# Patient Record
Sex: FEMALE | Race: BLACK OR AFRICAN AMERICAN | NOT HISPANIC OR LATINO | Employment: UNEMPLOYED | URBAN - METROPOLITAN AREA
[De-identification: names, ages, dates, MRNs, and addresses within clinical notes are randomized per-mention and may not be internally consistent; named-entity substitution may affect disease eponyms.]

---

## 2023-10-24 ENCOUNTER — HOSPITAL ENCOUNTER (EMERGENCY)
Facility: HOSPITAL | Age: 23
Discharge: HOME | End: 2023-10-24
Payer: COMMERCIAL

## 2023-10-24 VITALS
SYSTOLIC BLOOD PRESSURE: 127 MMHG | OXYGEN SATURATION: 99 % | BODY MASS INDEX: 31.28 KG/M2 | TEMPERATURE: 99.3 F | HEART RATE: 88 BPM | DIASTOLIC BLOOD PRESSURE: 88 MMHG | WEIGHT: 170 LBS | RESPIRATION RATE: 16 BRPM | HEIGHT: 62 IN

## 2023-10-24 DIAGNOSIS — B96.89 BACTERIAL VAGINOSIS: ICD-10-CM

## 2023-10-24 DIAGNOSIS — N76.0 BACTERIAL VAGINOSIS: ICD-10-CM

## 2023-10-24 DIAGNOSIS — N76.0 ACUTE VAGINITIS: Primary | ICD-10-CM

## 2023-10-24 DIAGNOSIS — N89.8 VAGINAL DISCHARGE: ICD-10-CM

## 2023-10-24 LAB
APPEARANCE UR: CLEAR
BACTERIA #/AREA URNS AUTO: ABNORMAL /HPF
BILIRUB UR STRIP.AUTO-MCNC: NEGATIVE MG/DL
CLUE CELLS SPEC QL WET PREP: PRESENT
COLOR UR: YELLOW
GLUCOSE UR STRIP.AUTO-MCNC: NORMAL MG/DL
HCG UR QL IA.RAPID: NEGATIVE
HYALINE CASTS #/AREA URNS AUTO: ABNORMAL /LPF
KETONES UR STRIP.AUTO-MCNC: ABNORMAL MG/DL
LEUKOCYTE ESTERASE UR QL STRIP.AUTO: NEGATIVE
MUCOUS THREADS #/AREA URNS AUTO: ABNORMAL /LPF
NITRITE UR QL STRIP.AUTO: NEGATIVE
PH UR STRIP.AUTO: 6 [PH]
PROT UR STRIP.AUTO-MCNC: ABNORMAL MG/DL
RBC # UR STRIP.AUTO: NEGATIVE /UL
RBC #/AREA URNS AUTO: ABNORMAL /HPF
SP GR UR STRIP.AUTO: 1.03
SQUAMOUS #/AREA URNS AUTO: ABNORMAL /HPF
T VAGINALIS SPEC QL WET PREP: ABNORMAL
UROBILINOGEN UR STRIP.AUTO-MCNC: NORMAL MG/DL
WBC #/AREA URNS AUTO: ABNORMAL /HPF
WBC VAG QL WET PREP: ABNORMAL
YEAST VAG QL WET PREP: ABNORMAL

## 2023-10-24 PROCEDURE — 2500000001 HC RX 250 WO HCPCS SELF ADMINISTERED DRUGS (ALT 637 FOR MEDICARE OP): Performed by: PHYSICIAN ASSISTANT

## 2023-10-24 PROCEDURE — 81001 URINALYSIS AUTO W/SCOPE: CPT | Performed by: PHYSICIAN ASSISTANT

## 2023-10-24 PROCEDURE — 87800 DETECT AGNT MULT DNA DIREC: CPT | Mod: WESLAB | Performed by: PHYSICIAN ASSISTANT

## 2023-10-24 PROCEDURE — 2500000004 HC RX 250 GENERAL PHARMACY W/ HCPCS (ALT 636 FOR OP/ED): Performed by: PHYSICIAN ASSISTANT

## 2023-10-24 PROCEDURE — 87210 SMEAR WET MOUNT SALINE/INK: CPT | Performed by: PHYSICIAN ASSISTANT

## 2023-10-24 PROCEDURE — 81025 URINE PREGNANCY TEST: CPT | Performed by: PHYSICIAN ASSISTANT

## 2023-10-24 PROCEDURE — 99283 EMERGENCY DEPT VISIT LOW MDM: CPT | Mod: 25

## 2023-10-24 RX ORDER — CEFTRIAXONE 500 MG/1
500 INJECTION, POWDER, FOR SOLUTION INTRAMUSCULAR; INTRAVENOUS ONCE
Status: COMPLETED | OUTPATIENT
Start: 2023-10-24 | End: 2023-10-24

## 2023-10-24 RX ORDER — AZITHROMYCIN 500 MG/1
1000 TABLET, FILM COATED ORAL ONCE
Status: COMPLETED | OUTPATIENT
Start: 2023-10-24 | End: 2023-10-24

## 2023-10-24 RX ORDER — METRONIDAZOLE 500 MG/1
500 TABLET ORAL EVERY 12 HOURS
Qty: 14 TABLET | Refills: 0 | Status: SHIPPED | OUTPATIENT
Start: 2023-10-24 | End: 2023-10-31

## 2023-10-24 RX ADMIN — CEFTRIAXONE SODIUM 500 MG: 500 INJECTION, POWDER, FOR SOLUTION INTRAMUSCULAR; INTRAVENOUS at 17:12

## 2023-10-24 RX ADMIN — AZITHROMYCIN MONOHYDRATE 1000 MG: 500 TABLET ORAL at 17:13

## 2023-10-24 ASSESSMENT — COLUMBIA-SUICIDE SEVERITY RATING SCALE - C-SSRS
2. HAVE YOU ACTUALLY HAD ANY THOUGHTS OF KILLING YOURSELF?: NO
6. HAVE YOU EVER DONE ANYTHING, STARTED TO DO ANYTHING, OR PREPARED TO DO ANYTHING TO END YOUR LIFE?: NO
1. IN THE PAST MONTH, HAVE YOU WISHED YOU WERE DEAD OR WISHED YOU COULD GO TO SLEEP AND NOT WAKE UP?: NO

## 2023-10-24 ASSESSMENT — PAIN - FUNCTIONAL ASSESSMENT: PAIN_FUNCTIONAL_ASSESSMENT: 0-10

## 2023-10-24 ASSESSMENT — LIFESTYLE VARIABLES
HAVE YOU EVER FELT YOU SHOULD CUT DOWN ON YOUR DRINKING: NO
HAVE PEOPLE ANNOYED YOU BY CRITICIZING YOUR DRINKING: NO
EVER FELT BAD OR GUILTY ABOUT YOUR DRINKING: NO
EVER HAD A DRINK FIRST THING IN THE MORNING TO STEADY YOUR NERVES TO GET RID OF A HANGOVER: NO

## 2023-10-24 ASSESSMENT — PAIN SCALES - GENERAL: PAINLEVEL_OUTOF10: 0 - NO PAIN

## 2023-10-24 NOTE — ED TRIAGE NOTES
"Vaginal discomfort x 2 months, odor, itching, discharge. 2 sexual partners within last month.  No cp, no sob. \"No chance of pregnancy\" - per pt, last cycle ended 09/18/23.  "

## 2023-10-24 NOTE — ED PROVIDER NOTES
HPI   Chief Complaint   Patient presents with    Vaginal Discharge       Is a 23-year-old female who complains of vaginal discharge x2 months.  Patient states she has a malodorous weird colored discharge that started about 2 months ago.  She says the discharge waxes and wanes as far as amount.  Patient states that it has a foul odor and she is worried that she might have an STI or BV.  Patient states she has had BV in the past and this feels like that.  Does not take any birth control.  Patient last menstrual cycle was last week of September.  Patient denies any pelvic pain.  She states she does have a history of herpes simplex virus but she does not currently have an outbreak.           Please see HPI for pertinent positive and negative ROS. The remaining 10 point ROS negative.                  No data recorded                Patient History   No past medical history on file.  No past surgical history on file.  No family history on file.  Social History     Tobacco Use    Smoking status: Not on file    Smokeless tobacco: Not on file   Substance Use Topics    Alcohol use: Not on file    Drug use: Not on file       Physical Exam   ED Triage Vitals [10/24/23 1135]   Temp Heart Rate Resp BP   37.4 °C (99.3 °F) 93 17 (!) 129/96      SpO2 Temp Source Heart Rate Source Patient Position   99 % Oral -- Sitting      BP Location FiO2 (%)     Left arm --       Physical Exam  GENERAL APPEARANCE: Awake and alert. No acute distress.   VITAL SIGNS: As per the nurses' triage record.  HEENT: Normocephalic, atraumatic. Extraocular muscles are intact. Conjunctiva are pink. Negative scleral icterus. Mucous membranes are moist. Tongue in the midline. Oropharynx clear, uvula midline.   NECK: Soft, nontender and supple, full gross ROM, no meningeal signs.  CHEST: Nontender to palpation. Clear to auscultation bilaterally. No rales, rhonchi, or wheezing. Symmetric rise and fall of chest wall.   HEART: Clear S1 and S2. Regular rate and  rhythm. No murmurs appreciated on auscultation.  Strong and equal pulses in the extremities.  ABDOMEN: Soft, nontender, nondistended, positive bowel sounds, no palpable masses.  Agate of CVA tenderness bilaterally.  MUSCULOSKELETAL: The calves are nontender to palpation. Full gross active range of motion. Ambulating on own with no acute difficulties  NEUROLOGICAL: Awake, alert and oriented x 3. Motor power intact in the upper and lower extremities. Sensation is intact to light touch in the upper and lower extremities. Patient answering questions appropriately.   IMMUNOLOGICAL: No lymphatic streaking noted  DERMATOLOGIC: Warm and dry without petechiae, rashes, or ecchymosis noted on visible skin.   Gynecologic exam: External genital exam reveals no lesions, vesicles, blisters, or other abrasions in the skin.  Internal vaginal canal shows milky white discharge.  There is no cervical petechiae.  No cervical motion tenderness.  Patient did not have discomfort with exam.  She did have a mild pressure from speculum but that is all.  KRISTA Gardner was chaperone for the exam  PYSCH: Cooperative with appropriate mood and affect.  ED Course & MDM   Diagnoses as of 10/24/23 1705   Acute vaginitis   Vaginal discharge   Bacterial vaginosis       Medical Decision Making  Parts of this chart have been completed using voice recognition software. Please excuse any errors of transcription.  My thought process and reason for plan has been formulated from the time that I saw the patient until the time of disposition and is not specific to one specific moment during their visit and furthermore my MDM encompasses this entire chart and not only this text box.      HPI: Detailed above.    Exam: A medically appropriate exam performed, outlined above, given the known history and presentation.    History obtained from: Patient      Social Determinants of Health considered during this visit: Lives at home    Medications given during  visit:  Medications   cefTRIAXone (Rocephin) vial 500 mg (has no administration in time range)   azithromycin (Zithromax) tablet 1,000 mg (has no administration in time range)        Diagnostic/tests  Labs Reviewed   URINALYSIS WITH REFLEX MICROSCOPIC - Abnormal       Result Value    Color, Urine Yellow      Appearance, Urine Clear      Specific Gravity, Urine 1.034      pH, Urine 6.0      Protein, Urine 50 (1+) (*)     Glucose, Urine Normal      Blood, Urine NEGATIVE      Ketones, Urine 20 (1+) (*)     Bilirubin, Urine NEGATIVE      Urobilinogen, Urine Normal      Nitrite, Urine NEGATIVE      Leukocyte Esterase, Urine NEGATIVE     WET PREP, GENITAL - Abnormal    Trichomonas None Seen      Clue Cells Present (*)     Yeast None Seen      WBC 1-2     MICROSCOPIC ONLY, URINE - Abnormal    WBC, Urine 6-10 (*)     RBC, Urine 6-10 (*)     Squamous Epithelial Cells, Urine 1-9 (SPARSE)      Bacteria, Urine 1+ (*)     Mucus, Urine 4+      Hyaline Casts, Urine OCCASIONAL (*)    HCG, URINE, QUALITATIVE - Normal    HCG, Urine NEGATIVE     C. TRACHOMATIS + N. GONORRHOEAE, AMPLIFIED      No orders to display        Considerations/further MDM:    The patient was evaluated in the emergency department and an etiology requiring emergent treatment or admission to hospital was not identified.  All labs, imaging, and diagnostic studies were reviewed by me and the patient was counseled on clinical impression, expectations, and plan.  Patient was educated that she does have clue cells present on her wet prep which is indicative of bacterial vaginosis.  Patient does wish to get prophylactically treated for gonorrhea and chlamydia.  Patient was educated she was not tested for all STIs today.  Patient was educated she was tested for chlamydia, gonorrhea, and trichomonas.  It also shows dehydration but no leukocytes or nitrates.  She does not have urinary complaints including dysuria or increased frequency or urgency.  She was educated to  avoid sexual intercourse for the next 2 weeks.  Patient was educated follow-up with her PCP in 1 to 2 days.  Patient was also educated to follow-up with gynecology.  A referral was given to gynecology.  Patient was also educated to return to the emergency department with new onset pelvic pain or fevers. all questions from patient were answered.  They elicited understanding and were agreeable to course of treatment.  Patient was discharged in stable condition and given strict return precautions including new or worsening symptoms.      Procedure  Procedures     Judi Campo PA-C  10/24/23 9566

## 2023-10-25 LAB
C TRACH RRNA SPEC QL NAA+PROBE: NEGATIVE
N GONORRHOEA DNA SPEC QL PROBE+SIG AMP: NEGATIVE

## 2024-01-29 ENCOUNTER — HOSPITAL ENCOUNTER (EMERGENCY)
Facility: HOSPITAL | Age: 24
Discharge: HOME | End: 2024-01-29
Attending: EMERGENCY MEDICINE
Payer: COMMERCIAL

## 2024-01-29 VITALS
RESPIRATION RATE: 18 BRPM | HEIGHT: 62 IN | HEART RATE: 80 BPM | DIASTOLIC BLOOD PRESSURE: 76 MMHG | SYSTOLIC BLOOD PRESSURE: 125 MMHG | TEMPERATURE: 96.6 F | WEIGHT: 176 LBS | BODY MASS INDEX: 32.39 KG/M2 | OXYGEN SATURATION: 100 %

## 2024-01-29 DIAGNOSIS — N76.0 BACTERIAL VAGINOSIS: Primary | ICD-10-CM

## 2024-01-29 DIAGNOSIS — B96.89 BACTERIAL VAGINOSIS: Primary | ICD-10-CM

## 2024-01-29 LAB
APPEARANCE UR: CLEAR
BILIRUB UR STRIP.AUTO-MCNC: NEGATIVE MG/DL
CLUE CELLS SPEC QL WET PREP: PRESENT
COLOR UR: COLORLESS
GLUCOSE UR STRIP.AUTO-MCNC: NORMAL MG/DL
HCG UR QL IA.RAPID: NEGATIVE
KETONES UR STRIP.AUTO-MCNC: NEGATIVE MG/DL
LEUKOCYTE ESTERASE UR QL STRIP.AUTO: NEGATIVE
NITRITE UR QL STRIP.AUTO: NEGATIVE
PH UR STRIP.AUTO: 6 [PH]
PROT UR STRIP.AUTO-MCNC: NEGATIVE MG/DL
RBC # UR STRIP.AUTO: NEGATIVE /UL
SP GR UR STRIP.AUTO: 1.01
T VAGINALIS SPEC QL WET PREP: ABNORMAL
UROBILINOGEN UR STRIP.AUTO-MCNC: NORMAL MG/DL
WBC VAG QL WET PREP: ABNORMAL
YEAST VAG QL WET PREP: ABNORMAL

## 2024-01-29 PROCEDURE — 99284 EMERGENCY DEPT VISIT MOD MDM: CPT | Performed by: EMERGENCY MEDICINE

## 2024-01-29 PROCEDURE — 87205 SMEAR GRAM STAIN: CPT | Mod: WESLAB | Performed by: EMERGENCY MEDICINE

## 2024-01-29 PROCEDURE — 81003 URINALYSIS AUTO W/O SCOPE: CPT | Performed by: EMERGENCY MEDICINE

## 2024-01-29 PROCEDURE — 81025 URINE PREGNANCY TEST: CPT | Performed by: EMERGENCY MEDICINE

## 2024-01-29 PROCEDURE — 99283 EMERGENCY DEPT VISIT LOW MDM: CPT

## 2024-01-29 PROCEDURE — 87210 SMEAR WET MOUNT SALINE/INK: CPT | Performed by: EMERGENCY MEDICINE

## 2024-01-29 RX ORDER — METRONIDAZOLE 500 MG/1
500 TABLET ORAL 2 TIMES DAILY
Qty: 14 TABLET | Refills: 0 | Status: SHIPPED | OUTPATIENT
Start: 2024-01-29 | End: 2024-02-05

## 2024-01-29 RX ORDER — METRONIDAZOLE 500 MG/1
500 TABLET ORAL 2 TIMES DAILY
Qty: 14 TABLET | Refills: 0 | Status: SHIPPED | OUTPATIENT
Start: 2024-01-29 | End: 2024-01-29 | Stop reason: SDUPTHER

## 2024-01-29 ASSESSMENT — LIFESTYLE VARIABLES
REASON UNABLE TO ASSESS: NO
EVER HAD A DRINK FIRST THING IN THE MORNING TO STEADY YOUR NERVES TO GET RID OF A HANGOVER: NO
EVER FELT BAD OR GUILTY ABOUT YOUR DRINKING: NO
HAVE PEOPLE ANNOYED YOU BY CRITICIZING YOUR DRINKING: NO
HAVE YOU EVER FELT YOU SHOULD CUT DOWN ON YOUR DRINKING: NO

## 2024-01-29 ASSESSMENT — COLUMBIA-SUICIDE SEVERITY RATING SCALE - C-SSRS
1. IN THE PAST MONTH, HAVE YOU WISHED YOU WERE DEAD OR WISHED YOU COULD GO TO SLEEP AND NOT WAKE UP?: NO
6. HAVE YOU EVER DONE ANYTHING, STARTED TO DO ANYTHING, OR PREPARED TO DO ANYTHING TO END YOUR LIFE?: NO
2. HAVE YOU ACTUALLY HAD ANY THOUGHTS OF KILLING YOURSELF?: NO

## 2024-01-29 ASSESSMENT — PAIN SCALES - GENERAL: PAINLEVEL_OUTOF10: 3

## 2024-01-29 ASSESSMENT — PAIN - FUNCTIONAL ASSESSMENT: PAIN_FUNCTIONAL_ASSESSMENT: 0-10

## 2024-01-29 NOTE — ED PROVIDER NOTES
EMERGENCY DEPARTMENT ENCOUNTER      [ ] CODE STEMI [ ] CODE Neuro [ ] CODE Yellow [ ] Modified Trauma [ ] CODE Blue      CHIEF COMPLAINT      Chief Complaint   Patient presents with    Vaginal Discharge     +odor +discomfort. X 1 month       Mode of Arrival:Car  Primary Care Provider: No Assigned PCP Generic Provider, MD  Medical Record Number: [id number]      History obtained by: Patient  Limited by nothing  Time seen: 12:20 PM    QUALITY MEASURES   PPE Utilized: N95 with goggles and gloves      HPI      Delaney Fonseca is a 23 y.o. female with a history of bacterial vaginosis in the past, treated successfully with Flagyl, comes back to the emergency room stating that for the last month she has noticed some foul odor and whitish discharge that is consistent with her prior episode of bacterial vaginosis in October last year.  States that she is sexually active with only 1 partner and does not use birth control however also states that she recently ended her period a few days ago.  Denies pregnancy.  Also denies any dysuria or hematuria or any other coming symptoms such as abdominal pain or vaginal bleeding.  No other complaints.      Patient otherwise denies fever, chills, n/v/d, chest pain, shortness of breath, sore throat, cough, rhinorrhea, abdominal pain, dysuria, hematuria, swollen extremities or skin changes, hematemesis, hematochezia, melena or any other accompanying symptoms of late.      The patient has no other complaints at this time.               PAST MEDICAL HISTORY    No past medical history on file.      I have personally reviewed the patient's past medical history in the records.  Elmo Reid MD    SURGICAL HISTORY    No past surgical history on file.    I have personally reviewed the patient's past surgical history in the records.  Elmo Reid MD    CURRENT MEDICATIONS    I have reviewed the patient’s medications.   Please see nursing and pharmacy records for the most up to date list.  "    [unfilled]    ALLERGIES    No Known Allergies    I have personally reviewed the patient's past history of allergies in the records.  Elmo Reid MD    FAMILY HISTORY    No family history on file.    I have personally reviewed the patient's family history in the records.  Elmo Reid MD    SOCIAL HISTORY    Social History     Socioeconomic History    Marital status: Single     Spouse name: Not on file    Number of children: Not on file    Years of education: Not on file    Highest education level: Not on file   Occupational History    Not on file   Tobacco Use    Smoking status: Never    Smokeless tobacco: Never   Substance and Sexual Activity    Alcohol use: Not on file    Drug use: Not on file    Sexual activity: Not on file   Other Topics Concern    Not on file   Social History Narrative    Not on file     Social Determinants of Health     Financial Resource Strain: Not on file   Food Insecurity: Not on file   Transportation Needs: Not on file   Physical Activity: Not on file   Stress: Not on file   Social Connections: Not on file   Intimate Partner Violence: Not on file   Housing Stability: Not on file         I have personally reviewed the patient's social history in the records.  Elmo Reid MD    REVIEW OF SYSTEMS      14 point ROS was reviewed and negative except as noted above in HPI.      PHYSICAL EXAM    VITAL SIGNS:    /86   Pulse 79   Temp 35.9 °C (96.6 °F)   Resp 16   Ht 1.575 m (5' 2\")   Wt 79.8 kg (176 lb)   SpO2 100%   BMI 32.19 kg/m²    Review EMR for vital signs  Nursing note and vitals reviewed.    Constitutional:  Alert and oriented, well-developed, well-nourished, appears stated age, non-toxic appearing  HENT:  Normocephalic, atraumatic, bilateral external ears normal, oropharynx moist, Nose normal.  Neck: normal range of motion, no tenderness, supple, no stridor.  Eyes:  PERRL, EOMI, conjunctiva normal, no discharge.   Cardiovascular:  Normal heart rate, normal rhythm, no " murmurs, no rubs, no gallops.   Respiratory:  Normal breath sounds, no respiratory distress, no wheezing, no chest wall tenderness.   GI:  Bowel sounds normal, soft, no tenderness, no rebound or guarding, no distention, no masses pulsatile or otherwise   (any female  exam was done with female chaperone present):   Deferred  Integument:  Warm, dry, no erythema, no rash, no edema.   Back:  No midline tenderness, no CVA tenderness.   Musculoskeletal:  Intact distal pulses, no tenderness, no cyanosis, no clubbing, with capillary refill less than 2 seconds. Good range of motion in all major joints. No tenderness to palpation or major deformities noted.    Neurologic:  Alert & oriented x 3, normal motor function, normal sensory function, no focal deficits noted. Cranial nerves II-XII intact.  Psychiatric:  Affect normal, judgment normal, mood normal.     EKG  None         Reviewed and interpreted by me, Elmo Reid MD             RADIOLOGY  No orders to display       All Imaging studies evaluated and interpreted by ED physician except when noted otherwise.    ED PROVIDER INTERPRETATION (XRAYS ONLY):       *I have interpreted the x-ray real-time in the ED myself, and made a clinical decision on it prior to the formal radiology reading.    Elmo Reid M.D.    RADIOLOGIST IMPRESSION (U/S, CT, MRI):   No orders to display         PERTINENT LABS    Please refer to the chart for all lab work and to MDM for relevant discussion.      PROCEDURE    None (procdoc)    ED COURSE & MEDICAL DECISION MAKING    Pertinent Labs & Imaging studies reviewed. (See chart for details)    MDM:    Assessment: Delaney Fonseca is a 23 y.o.female who presents to the ED with suspected bacterial vaginosis versus other STD, patient was offered vaginal exam given her history of herpes simplex but denies outbreak and states that this feels similar to prior bacterial vaginosis and therefore request only swabbing as well as urine testing and  "pregnancy testing with low threshold to treat as needed.  Patient understands and agrees with plan        Prior records in EPIC reviewed by me.     2023 Coding Requirements:  --Independent historian(s):    see HPI  --Review of prior records:    EHR reviewed   --Relevant comorbidities:    see records  --Social determinants of health:          I have considered the following in this patient's vaginal bleeding Non-pregnancy related: retained foreign body, laceration or trauma-related bleeding, UTI, vaginal infection, malignancy, bleeding disorder (hemophilia, von Willebrand disease), polyps, fibroids, dysfunction uterine bleeding.      Patient positive for clue cells but otherwise urine and hCG within normal limits with hCG negative and patient agrees to discharge with bacterial vaginosis treatment of Flagyl and strict return precautions.        ED VITALS  Vitals:    01/29/24 1059 01/29/24 1135   BP: 128/86    Pulse: 79    Resp: 16    Temp: 35.9 °C (96.6 °F)    SpO2: 100% 100%   Weight: 79.8 kg (176 lb)    Height: 1.575 m (5' 2\")        BP  Min: 128/86  Max: 128/86    As part of the 2022 [facility] reporting requirements, the following measures have been reviewed and documented:    None     1. Bacterial vaginosis        Diagnoses as of 01/29/24 1220   Bacterial vaginosis         DISPOSITION       DISCHARGE.  The patient is discharged back to their place of residence.  Discharge diagnosis, instructions and plan were discussed and understood. At the time of discharge the patient was comfortable and was in no apparent distress. Patient is aware of diagnostic uncertainty and was notified though testing is negative here, there is a very small chance that pathology may be missed.  The patient understands these risks and the patient /family understood to return immediately to the emergency department if the symptoms worsen or if they have any additional concerns.    DISCHARGE MEDICATIONS  New Prescriptions    No medications on file "       FOLLOW UP  No follow-up provider specified.    Elmo Reid    This note was created with the assistance of voice recognition technology.  While attempts were made to ensure accuracy, mis-transcription may be present due to limitations in the software.        Electronically signed by MD Elmo Ibanez MD  01/29/24 7919

## 2024-01-30 LAB
CLUE CELLS VAG LPF-#/AREA: PRESENT /[LPF]
NUGENT SCORE: 10
YEAST VAG WET PREP-#/AREA: ABNORMAL

## 2024-01-31 ENCOUNTER — TELEPHONE (OUTPATIENT)
Dept: PHARMACY | Facility: HOSPITAL | Age: 24
End: 2024-01-31
Payer: COMMERCIAL

## 2024-01-31 NOTE — PROGRESS NOTES
EDPD Note: Antibiotics Reviewed and Warranted    Contacted Ms. Delaney Fonseca regarding a positive Wet Prep, Genital and Vaginitis Gram Stain (Presence of clue cells, positive for Bacterial vaginosis) results that were taken during their recent emergency room visit. I completed education with patient. The patient is being treated appropriately with metronidazole 500 m tablet PO BID x 7 days. Symptoms are improving and patient had no additional questions regarding this medication. Educated patient on potential antibiotic side effects and avoiding any alcohol-containing products with/after medication completion. Patient to follow up with nearest Urgent Care if symptoms worsen.     Wet Prep, Genital  Order: [id number]  Status: Final result       Visible to patient: Yes (seen)    0 Result Notes        Component  Ref Range & Units 2 d ago 3 mo ago 10 mo ago   Trichomonas  None Seen None Seen None Seen    Clue Cells  None Seen Present Abnormal  Present Abnormal     Yeast  None Seen None Seen None Seen    WBC NONE SEEN 1-2 FEW WBC'S PRESENT Abnormal  R   Resulting [facility]              Specimen Collected: 24 11:39 Last Resulted: 24 11:55            Vaginitis Gram Stain For Bacterial Vaginosis + Yeast  Order: [id number]  Collected 2024 11:38       Status: Final result       Visible to patient: Yes (not seen)    Specimen Information: Vaginal; Swab   0 Result Notes       1 Follow-up Encounter      Component  Ref Range & Units    Yoana Score  0 - 3 10 High    Comment: Interpretation of the Yoana Score  0-3.....Normal vaginal microbiota  4-6.....Intermediate results  7-10....Bacterial vaginosis   Yeast  ABSENT ABSENT   Clue Cells  ABSENT PRESENT Abnormal    Resulting [facility]              Specimen Collected: 24 11:38 Last Resulted: 24 16:42           No further follow up needed from EDPD Team.     Cora Stewart, Jean CarlosD